# Patient Record
Sex: MALE | Race: WHITE | NOT HISPANIC OR LATINO | Employment: OTHER | URBAN - METROPOLITAN AREA
[De-identification: names, ages, dates, MRNs, and addresses within clinical notes are randomized per-mention and may not be internally consistent; named-entity substitution may affect disease eponyms.]

---

## 2017-08-15 ENCOUNTER — TELEPHONE (OUTPATIENT)
Dept: TRANSPLANT | Facility: CLINIC | Age: 72
End: 2017-08-15

## 2017-08-16 ENCOUNTER — TELEPHONE (OUTPATIENT)
Dept: GASTROENTEROLOGY | Facility: CLINIC | Age: 72
End: 2017-08-16

## 2017-08-16 NOTE — TELEPHONE ENCOUNTER
----- Message from Katrina Grayson MA sent at 8/16/2017 12:01 PM CDT -----  Contact: Pt:[phone]  Nonothing I know of for stones in the gallbladder  ----- Message -----  From: Evelyn Newberry MA  Sent: 8/16/2017  11:13 AM  To: Primo Saavedra, do we do this procedure?  If not, send the message back and I will call the patient.  Thank you,  Ning  ----- Message -----  From: Doris Bojorquez  Sent: 8/16/2017  11:02 AM  To: [facility] Gastro Clinical Staff    Pt would like to know if we preform a procedure that requires a scope to break down gallstones.

## 2017-08-16 NOTE — TELEPHONE ENCOUNTER
Returned call, no answer, could not leave a message because patients voice mail has not been set up.  Will continue trying.

## 2017-08-16 NOTE — TELEPHONE ENCOUNTER
----- Message from Katrina Grayson MA sent at 8/16/2017 12:01 PM CDT -----  Contact: Pt:[phone]  Nonothing I know of for stones in the gallbladder  ----- Message -----  From: Evelyn Newberry MA  Sent: 8/16/2017  11:13 AM  To: Primo Saavedra, do we do this procedure?  If not, send the message back and I will call the patient.  Thank you,  Ning  ----- Message -----  From: Doris Bojorquez  Sent: 8/16/2017  11:02 AM  To: [facility] Gastro Clinical Staff    Pt would like to know if we preform a procedure that requires a scope to break down gallstones.

## 2023-12-13 ENCOUNTER — TELEPHONE (OUTPATIENT)
Dept: INFECTIOUS DISEASES | Facility: CLINIC | Age: 78
End: 2023-12-13

## 2023-12-13 NOTE — TELEPHONE ENCOUNTER
----- Message from Laura Lake PA-C sent at 12/13/2023 10:29 AM CST -----  Regarding: FW: Needs return call  No referral seen in the chart, but we are not accepting external Medicaid patients at this time. Please inform the referring provider.    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      
----- Message from Laura Lake PA-C sent at 12/13/2023 10:29 AM CST -----  Regarding: FW: Needs return call  No referral seen in the chart, but we are not accepting external Medicaid patients at this time. Please inform the referring provider.    ----- Message -----  From: Chhaya Cline  Sent: 12/13/2023   9:21 AM CST  To: [facility] Infectious Diseases Clinical Support  Subject: Needs return call                                Kaitlynn SIERRA at  [facility]       She is referring this patient for postive fungal cultures of bine and joint please call to advise     Callback: [phone]      
Per TOMÁS Moss - returned call to Ana at [place], informed No referral seen in the chart, but we are not accepting external Medicaid patients at this time. Ana explained that she thinks the [employer] medicare is primary and Medicaid is secondary. I explained until the patient or designee calls [facility] and has the insurance updated in the computer, we cannot accept the referral at this time as Medicaid is listed as primary. Kaitlynn will discuss with patient .  
0

## 2023-12-13 NOTE — TELEPHONE ENCOUNTER
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

## 2023-12-27 ENCOUNTER — TELEPHONE (OUTPATIENT)
Dept: INFECTIOUS DISEASES | Facility: CLINIC | Age: 78
End: 2023-12-27

## 2023-12-27 NOTE — TELEPHONE ENCOUNTER
Received call from Kaitlynn Toure NP at [facility] regarding Dr. Panda assuming ID care of patient.  Per Dr. Panda - Pt is established with [facility] (consulted while in pt at [facility]), therefore, (Dr. Panda) cannot accept care as patient is already established with and receiving care with ID.

## 2024-02-23 ENCOUNTER — TELEPHONE (OUTPATIENT)
Dept: FAMILY MEDICINE | Facility: CLINIC | Age: 79
End: 2024-02-23

## 2024-02-23 NOTE — TELEPHONE ENCOUNTER
----- Message from Rosaline Schneider sent at 2/23/2024 10:16 AM CST -----  Contact: Nilsa/ [facility]  Nilsa is calling in regards needing a 4 wheel drive vehicle to get the the pt house and states that outpatient physical therapy is recommended.  If any questions please call back at [phone]      Thanks

## 2024-07-12 PROBLEM — K59.00 CONSTIPATION: Status: ACTIVE | Noted: 2024-07-12

## 2024-07-12 PROBLEM — Z85.46 H/O PROSTATE CANCER: Chronic | Status: ACTIVE | Noted: 2024-07-12

## 2024-07-12 PROBLEM — F10.20 ALCOHOL DEPENDENCE: Chronic | Status: ACTIVE | Noted: 2024-07-12

## 2024-07-12 PROBLEM — I10 HYPERTENSION: Chronic | Status: ACTIVE | Noted: 2024-07-12

## 2024-07-12 PROBLEM — Z71.89 ACP (ADVANCE CARE PLANNING): Status: ACTIVE | Noted: 2024-07-12

## 2024-07-12 PROBLEM — K52.9 COLITIS: Status: ACTIVE | Noted: 2024-07-12

## 2025-07-02 ENCOUNTER — TELEPHONE (OUTPATIENT)
Dept: PHYSICAL MEDICINE AND REHAB | Facility: CLINIC | Age: 80
End: 2025-07-02
Payer: MEDICAID

## 2025-07-02 NOTE — TELEPHONE ENCOUNTER
Reached out to patient to let him know that the appointment on 7/21/25 has been canceled due to the provider being out of office. No answer/ I left voice message for him informing him of this information and advised that he call the office back to be rescheduled.